# Patient Record
Sex: FEMALE | Race: OTHER | HISPANIC OR LATINO | URBAN - METROPOLITAN AREA
[De-identification: names, ages, dates, MRNs, and addresses within clinical notes are randomized per-mention and may not be internally consistent; named-entity substitution may affect disease eponyms.]

---

## 2021-04-29 ENCOUNTER — EMERGENCY (EMERGENCY)
Facility: HOSPITAL | Age: 41
LOS: 1 days | Discharge: HOME | End: 2021-04-29
Attending: EMERGENCY MEDICINE
Payer: COMMERCIAL

## 2021-04-29 VITALS
OXYGEN SATURATION: 98 % | TEMPERATURE: 98 F | DIASTOLIC BLOOD PRESSURE: 77 MMHG | WEIGHT: 136.91 LBS | SYSTOLIC BLOOD PRESSURE: 119 MMHG | HEIGHT: 58.27 IN | RESPIRATION RATE: 18 BRPM | HEART RATE: 73 BPM

## 2021-04-29 PROCEDURE — 73030 X-RAY EXAM OF SHOULDER: CPT

## 2021-04-29 PROCEDURE — 99283 EMERGENCY DEPT VISIT LOW MDM: CPT | Mod: 25

## 2021-04-29 PROCEDURE — 99284 EMERGENCY DEPT VISIT MOD MDM: CPT

## 2021-04-29 PROCEDURE — 73030 X-RAY EXAM OF SHOULDER: CPT | Mod: 26,RT

## 2021-04-29 RX ORDER — PHENAZOPYRIDINE HCL 100 MG
1 TABLET ORAL
Qty: 6 | Refills: 0
Start: 2021-04-29 | End: 2021-04-30

## 2021-04-29 RX ORDER — OXYCODONE AND ACETAMINOPHEN 5; 325 MG/1; MG/1
1 TABLET ORAL EVERY 4 HOURS
Refills: 0 | Status: DISCONTINUED | OUTPATIENT
Start: 2021-04-29 | End: 2021-04-29

## 2021-04-29 RX ORDER — LACTOBACILLUS ACIDOPHILUS 100MM CELL
2 CAPSULE ORAL
Qty: 28 | Refills: 0
Start: 2021-04-29 | End: 2021-05-12

## 2021-04-29 RX ORDER — IBUPROFEN 200 MG
1 TABLET ORAL
Qty: 40 | Refills: 0
Start: 2021-04-29 | End: 2021-05-08

## 2021-04-29 RX ADMIN — OXYCODONE AND ACETAMINOPHEN 1 TABLET(S): 5; 325 TABLET ORAL at 16:26

## 2021-04-29 RX ADMIN — OXYCODONE AND ACETAMINOPHEN 1 TABLET(S): 5; 325 TABLET ORAL at 17:31
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
## 2021-04-29 NOTE — ED PROVIDER NOTE - PHYSICAL EXAMINATION
Some bruising and skin discoloration  R shoulder tenderness . no deformity, Range of motion restricted.
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
## 2021-04-29 NOTE — ED PROVIDER NOTE - PATIENT PORTAL LINK FT
You can access the FollowMyHealth Patient Portal offered by [facility] by registering at the following website: [email or web address]. By joining [employer]’s FollowMyHealth portal, you will also be able to view your health information using other applications (apps) compatible with our system.

## 2021-04-29 NOTE — ED ADULT NURSE NOTE - CHPI ED NUR SYMPTOMS NEG
no acting out behaviors/no crying/no disorientation/no dizziness/no fussiness/no headache/no laceration/no neck tenderness

## 2022-06-20 ENCOUNTER — EMERGENCY (EMERGENCY)
Facility: HOSPITAL | Age: 42
LOS: 1 days | Discharge: HOME | End: 2022-06-20
Attending: EMERGENCY MEDICINE
Payer: MEDICAID

## 2022-06-20 VITALS
WEIGHT: 130.29 LBS | SYSTOLIC BLOOD PRESSURE: 115 MMHG | OXYGEN SATURATION: 98 % | DIASTOLIC BLOOD PRESSURE: 69 MMHG | RESPIRATION RATE: 19 BRPM | TEMPERATURE: 98 F | HEART RATE: 68 BPM | HEIGHT: 60 IN

## 2022-06-20 LAB
APPEARANCE UR: CLEAR — SIGNIFICANT CHANGE UP
BACTERIA # UR AUTO: ABNORMAL /HPF
BILIRUB UR-MCNC: NEGATIVE — SIGNIFICANT CHANGE UP
COLOR SPEC: YELLOW — SIGNIFICANT CHANGE UP
DIFF PNL FLD: ABNORMAL
EPI CELLS # UR: SIGNIFICANT CHANGE UP /HPF
GLUCOSE UR QL: NEGATIVE — SIGNIFICANT CHANGE UP
HCG UR QL: NEGATIVE — SIGNIFICANT CHANGE UP
KETONES UR-MCNC: NEGATIVE — SIGNIFICANT CHANGE UP
LEUKOCYTE ESTERASE UR-ACNC: NEGATIVE — SIGNIFICANT CHANGE UP
NITRITE UR-MCNC: NEGATIVE — SIGNIFICANT CHANGE UP
PH UR: 5 — SIGNIFICANT CHANGE UP (ref 5–8)
PROT UR-MCNC: 15
RBC CASTS # UR COMP ASSIST: SIGNIFICANT CHANGE UP /HPF (ref 0–2)
SP GR SPEC: 1.03 — HIGH (ref 1.01–1.02)
UROBILINOGEN FLD QL: NEGATIVE — SIGNIFICANT CHANGE UP
WBC UR QL: SIGNIFICANT CHANGE UP /HPF (ref 0–5)

## 2022-06-20 PROCEDURE — 81001 URINALYSIS AUTO W/SCOPE: CPT

## 2022-06-20 PROCEDURE — 99283 EMERGENCY DEPT VISIT LOW MDM: CPT

## 2022-06-20 PROCEDURE — 81025 URINE PREGNANCY TEST: CPT

## 2022-06-20 PROCEDURE — 87086 URINE CULTURE/COLONY COUNT: CPT

## 2022-06-20 RX ORDER — CEFUROXIME AXETIL 250 MG
500 TABLET ORAL ONCE
Refills: 0 | Status: COMPLETED | OUTPATIENT
Start: 2022-06-20 | End: 2022-06-20

## 2022-06-20 RX ORDER — PHENAZOPYRIDINE HCL 100 MG
2 TABLET ORAL
Qty: 12 | Refills: 0
Start: 2022-06-20 | End: 2022-06-21

## 2022-06-20 RX ORDER — CEFUROXIME AXETIL 250 MG
1 TABLET ORAL
Qty: 10 | Refills: 0
Start: 2022-06-20 | End: 2022-06-24

## 2022-06-20 RX ORDER — PHENAZOPYRIDINE HCL 100 MG
100 TABLET ORAL ONCE
Refills: 0 | Status: COMPLETED | OUTPATIENT
Start: 2022-06-20 | End: 2022-06-20

## 2022-06-20 RX ADMIN — Medication 100 MILLIGRAM(S): at 18:17

## 2022-06-20 RX ADMIN — Medication 500 MILLIGRAM(S): at 18:17

## 2022-06-20 NOTE — ED PROVIDER NOTE - NSFOLLOWUPINSTRUCTIONS_ED_ALL_ED_FT
Please take medication as indicated. pyridium will make your urine look red. return if symptoms no improvement after 2 days or symptoms worsens. stay hydrated. see your MD otherwise

## 2022-06-20 NOTE — ED PROVIDER NOTE - WET READ LAUNCH FT
Dr. Nilsa Levi:     Please advise how soon before dental procedure should patient hold Warfarin There are no Wet Read(s) to document.

## 2022-06-20 NOTE — ED PROVIDER NOTE - PATIENT PORTAL LINK FT
You can access the FollowMyHealth Patient Portal offered by [facility] by registering at the following website: [email or web address]. By joining [employer]’s FollowMyHealth portal, you will also be able to view your health information using other applications (apps) compatible with our system.

## 2022-06-20 NOTE — ED PROVIDER NOTE - CLINICAL SUMMARY MEDICAL DECISION MAKING FREE TEXT BOX
Patient's symptoms consistent with UTI. Will treat, send cultures, start on cefuroxime, and follow up with doctor as needed.

## 2022-06-20 NOTE — ED ADULT NURSE NOTE - CAS TRG GENERAL NORM CIRC DET
Strong peripheral pulses/Capillary refill less/equal to 2 seconds
Detail Level: Zone
Detail Level: Detailed

## 2022-06-21 LAB
CULTURE RESULTS: SIGNIFICANT CHANGE UP
SPECIMEN SOURCE: SIGNIFICANT CHANGE UP

## 2023-08-10 ENCOUNTER — EMERGENCY (EMERGENCY)
Facility: HOSPITAL | Age: 43
LOS: 1 days | Discharge: HOME | End: 2023-08-10
Attending: EMERGENCY MEDICINE
Payer: MEDICAID

## 2023-08-10 VITALS
OXYGEN SATURATION: 99 % | SYSTOLIC BLOOD PRESSURE: 138 MMHG | DIASTOLIC BLOOD PRESSURE: 82 MMHG | WEIGHT: 130.07 LBS | TEMPERATURE: 98 F | HEART RATE: 70 BPM | RESPIRATION RATE: 18 BRPM

## 2023-08-10 VITALS
SYSTOLIC BLOOD PRESSURE: 101 MMHG | OXYGEN SATURATION: 98 % | HEART RATE: 60 BPM | TEMPERATURE: 98 F | RESPIRATION RATE: 16 BRPM | DIASTOLIC BLOOD PRESSURE: 67 MMHG

## 2023-08-10 PROBLEM — Z98.891 HISTORY OF UTERINE SCAR FROM PREVIOUS SURGERY: Chronic | Status: ACTIVE | Noted: 2022-06-20

## 2023-08-10 PROCEDURE — 99284 EMERGENCY DEPT VISIT MOD MDM: CPT

## 2023-08-10 PROCEDURE — 93005 ELECTROCARDIOGRAM TRACING: CPT

## 2023-08-10 PROCEDURE — 96361 HYDRATE IV INFUSION ADD-ON: CPT

## 2023-08-10 PROCEDURE — 93010 ELECTROCARDIOGRAM REPORT: CPT

## 2023-08-10 PROCEDURE — 99284 EMERGENCY DEPT VISIT MOD MDM: CPT | Mod: 25

## 2023-08-10 PROCEDURE — 96374 THER/PROPH/DIAG INJ IV PUSH: CPT

## 2023-08-10 PROCEDURE — 96375 TX/PRO/DX INJ NEW DRUG ADDON: CPT

## 2023-08-10 RX ORDER — MECLIZINE HCL 12.5 MG
1 TABLET ORAL
Qty: 15 | Refills: 0
Start: 2023-08-10

## 2023-08-10 RX ORDER — METOCLOPRAMIDE HCL 10 MG
10 TABLET ORAL ONCE
Refills: 0 | Status: COMPLETED | OUTPATIENT
Start: 2023-08-10 | End: 2023-08-10

## 2023-08-10 RX ORDER — MECLIZINE HCL 12.5 MG
50 TABLET ORAL ONCE
Refills: 0 | Status: COMPLETED | OUTPATIENT
Start: 2023-08-10 | End: 2023-08-10

## 2023-08-10 RX ORDER — SODIUM CHLORIDE 9 MG/ML
1000 INJECTION INTRAMUSCULAR; INTRAVENOUS; SUBCUTANEOUS ONCE
Refills: 0 | Status: COMPLETED | OUTPATIENT
Start: 2023-08-10 | End: 2023-08-10

## 2023-08-10 RX ADMIN — Medication 10 MILLIGRAM(S): at 08:45

## 2023-08-10 RX ADMIN — SODIUM CHLORIDE 1000 MILLILITER(S): 9 INJECTION INTRAMUSCULAR; INTRAVENOUS; SUBCUTANEOUS at 09:45

## 2023-08-10 RX ADMIN — SODIUM CHLORIDE 1000 MILLILITER(S): 9 INJECTION INTRAMUSCULAR; INTRAVENOUS; SUBCUTANEOUS at 08:45

## 2023-08-10 RX ADMIN — Medication 50 MILLIGRAM(S): at 08:45

## 2023-08-10 RX ADMIN — Medication 1 MILLIGRAM(S): at 11:05
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
## 2023-08-10 NOTE — ED ADULT NURSE NOTE - TEMPLATE
Detail Level: Zone
Quality 226: Preventive Care And Screening: Tobacco Use: Screening And Cessation Intervention: Patient screened for tobacco use and is an ex/non-smoker
Neuro

## 2023-08-10 NOTE — ED PROVIDER NOTE - NSFOLLOWUPINSTRUCTIONS_ED_ALL_ED_FT
Vértigo posicional paroxístico josiane    LO QUE NECESITAS SABER:    El VPPB es loi afección del oído interno que hace que se sienta mareado repentinamente. Josiane significa que no es grave ni pone en peligro la yoana. El VPPB es causado por un problema con los nervios y la estructura de lopez oído interno. El VPPB ocurre cuando pequeños trozos de calcio se desprenden y se amontonan en elisabeth de los montero del oído interno. Anatomía del oído         INSTRUCCIONES DE DESCARGA:    Regrese al departamento de emergencias si:    Se  alex un episodio de VPPB y se lesiona.      Tiene un dolor de mariola severo que no desaparece.      Tiene nuevos cambios en lopez visión o se siente débil o confundido.      Tiene problemas para oír o tiene zumbidos o zumbidos en los oídos.    Comuníquese con lopez proveedor de atención médica si:    Christine síntomas de VPPB no desaparecen o regresan.      Tiene problemas de equilibrio o se  con frecuencia.      Tiene náuseas o vómitos nuevos o aumentados con vértigo.      Se siente ansioso o deprimido y no quiere salir de casa.      Tiene preguntas o inquietudes sobre lopez afección o atención.    Medicamentos:    Se pueden recomendar o recetar medicamentos para tratar los mareos o las náuseas.      Eakly lopez medicamento según las indicaciones. Comuníquese con lopez proveedor de atención médica si carly que lopez medicamento no le está ayudando o si tiene efectos secundarios. Dígale de jules si es alérgico a algún medicamento. Mantenga loi lista de los medicamentos, vitaminas y hierbas que sherri. Incluya las cantidades, cuándo y por qué las sherri. Lleve la lista o los frascos de pastillas a las visitas de seguimiento. Lleve consigo lopez lista de medicamentos en mario de loi emergencia.    Prevenga christine síntomas:    Trate de evitar movimientos bruscos de la mariola. Levántese y acuéstese lentamente.      Levante y apoye lopez mariola cuando se acueste. Coloque almohadas debajo de la parte superior de la espalda y la mariola o descanse en un sillón reclinable.      Cambie de posición con frecuencia cuando esté acostado. Trate de no acostarse con la mariola del mismo lado alex períodos prolongados. Ruede lentamente.      Use equipo de protección cuando toy en bicicleta o practique deportes. Un afshin ayuda a proteger lpoez mariola de lesiones.    Mary un seguimiento con lopez proveedor de atención médica según las indicaciones: Es posible que deba regresar en 1 mes para verificar el progreso de lopez tratamiento. Escriba christine preguntas para que recuerde hacerlas alex christine visitas.

## 2023-08-10 NOTE — ED PROVIDER NOTE - CLINICAL SUMMARY MEDICAL DECISION MAKING FREE TEXT BOX
Patient with impressive vertigo will give medications and reassess.  Patient does not likely have a stroke given the symptoms are completely positional and she is young with no past medical history

## 2023-08-10 NOTE — ED ADULT NURSE NOTE - NSFALLUNIVINTERV_ED_ALL_ED
Bed/Stretcher in lowest position, wheels locked, appropriate side rails in place/Call bell, personal items and telephone in reach/Instruct patient to call for assistance before getting out of bed/chair/stretcher/Non-slip footwear applied when patient is off stretcher/[place] to call system/Physically safe environment - no spills, clutter or unnecessary equipment/Purposeful proactive rounding/Room/bathroom lighting operational, light cord in reach

## 2023-08-10 NOTE — ED PROVIDER NOTE - PATIENT PORTAL LINK FT
You can access the FollowMyHealth Patient Portal offered by [facility] by registering at the following website: [email or web address]. By joining [employer]’s FollowMyHealth portal, you will also be able to view your health information using other applications (apps) compatible with our system.

## 2023-08-10 NOTE — ED ADULT NURSE NOTE - OBJECTIVE STATEMENT
Pt presents to ED complaining of dizziness since 5 am this morning. Denies headache, blurry vision or chest pain. Pt is A&Ox3in no acute distress.   Respirations easy and unlabored. Safety maintained. Pt awaiting evaluation by MD.

## 2023-08-10 NOTE — ED PROVIDER NOTE - PHYSICAL EXAMINATION
General: moderate distress, appears stated age  HEENT: normocephalic, atraumatic   Respiratory: normal work of breathing  MSK: no swelling or tenderness of lower extremities, moving all extremities spontaneously   Skin: warm, dry  Neuro: A&Ox3, cranial nerves II-XII intact, 5/5 strength in all extremities, no sensory deficits, normal gait, significant vertigo with minimal movement of her head to the right   Psych: appropriate affect

## 2023-08-10 NOTE — ED PROVIDER NOTE - OBJECTIVE STATEMENT
43-year-old female presents with vertigo.  Patient states when she is woke up this morning she felt the room since spinning sensation.  Symptoms occur only when she looks to the right.  She denies any trouble with speech swallowing understanding is weakness numbness paresthesias.  She has no family history of stroke.

## 2024-01-29 NOTE — ED ADULT NURSE NOTE - BEFAST ARM NUMBNESS
HR=66 bpm, YWFY=525/55 mmhg, SpO2=98.0 %, Resp=11 B/min, EtCO2=37 mmHg, Apnea=3 Seconds, Pain=0, Hough=2 No